# Patient Record
Sex: MALE | URBAN - METROPOLITAN AREA
[De-identification: names, ages, dates, MRNs, and addresses within clinical notes are randomized per-mention and may not be internally consistent; named-entity substitution may affect disease eponyms.]

---
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
## 2025-02-08 NOTE — BH INPATIENT PSYCHIATRY ASSESSMENT NOTE - NSBHMSEBODY_PSY_A_CORE
Problem: Fall Risk  Goal: Patient will remain free from falls  Description: Target End Date:  Prior to discharge or change in level of care    Document interventions on the Danette Derrick Fall Risk Assessment    1.  Assess for fall risk factors  2.  Implement fall precautions  Outcome: Progressing   The patient is Stable - Low risk of patient condition declining or worsening    Shift Goals  Clinical Goals: rest, tolerate feeds, have BM, void adequately, tolerate NC  Patient Goals: n/a  Family Goals: updates on POC    Progress made toward(s) clinical / shift goals:        Patient is not progressing towards the following goals:       Underweight